# Patient Record
(demographics unavailable — no encounter records)

---

## 2025-06-19 NOTE — DISCUSSION/SUMMARY
[FreeTextEntry1] : Mr. ANITRA DIAZ is a very pleasant 70 year man with a past medical history of htn and patel  #HTN: well controlled - continue lisinopril  #Dyspnea - check echocardiogram FU 6 mo [EKG obtained to assist in diagnosis and management of assessed problem(s)] : EKG obtained to assist in diagnosis and management of assessed problem(s)

## 2025-06-19 NOTE — HISTORY OF PRESENT ILLNESS
[FreeTextEntry1] : Mr. ANITRA DIAZ is a very pleasant 70 year man with a past medical history of htn presenting for evaluation of lower leg numbness and tingling. Feels well otherwise and has no chest pains. 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  Had ABIs done by a vascular physician---this was normal.   Otherwise, denies orthopnea, paroxysmal nocturnal dyspnea, lower extremity edema, unexplained weight gain or dyspnea on exertion.

## 2025-07-01 NOTE — ASSESSMENT
[FreeTextEntry1] : ASSESSMENT: [The patient was accompanied today and was assisted with explaining their complaints today.] The patient comes in today with chronic exacerbated symptoms of shoulder discomfort in the form of tendinopathy bursitis impingement symptoms. We have discussed treatment options including activity modification HEP/therapy. Today patient elects for [injections].   The patient was adequately and thoroughly informed of my assessment of their current condition(s).  - This may diminish bodily function for the extremity. We discussed prognosis, tx modalities including operative and nonoperative options for the above diagnostic assessment. As always, 2nd opinion is always provided as an option. When accessible, I was able to review other physicians note(s) including reviewing other tests, imaging results as well as personally view these results for my own interpretation.   [bilateral] SUBACROMIAL SHOULDER INJECTION   Indication:  subacromial bursitis/impingement, pain   Risk, benefits and alternatives were discussed with the patient. Potential complications include bleeding and infection. Alcohol was used to prep the area.  Ethyl chloride spray was used as a topical anesthetic.  Using sterile technique, the injection needle was then directed from a standard posterior approach parallel to and inferior to the acromion. A 21g needle was used to inject 5 mL of 1% Lidocaine and 1 unit 10mg Kenalog.  No significant resistance was encountered.  A bandage was applied.  The patient tolerated the procedure well.    Patient instructed to avoid strenuous activity for 2 day(s). Specifically counseled regarding the signs and symptoms of potential infection and instructed to present promptly to clinic or hospital if such signs and symptoms arise. The patient was adequately and thoroughly informed of my assessment of their current condition(s).  DISCUSSION: 1.  Injections as above. Activity modification HEP.  2. We prn 3. [x]

## 2025-07-01 NOTE — HISTORY OF PRESENT ILLNESS
[FreeTextEntry1] : This is a 70 year yo patient presenting today with a history of significant shoulder discomfort difficulty with overhead motion.  Difficulty lifting. Affecting ADLs.

## 2025-07-01 NOTE — PHYSICAL EXAM
[de-identified] : Examination of the [bilateral] shoulder reveals equal active and passive motion as compared to the contralateral side There is a positive Speed, positive Marzena, positive Cabrera, tenderness with anterior shoulder compression. 3+/5 strength  [de-identified] : [4] views of [bilateral] shoulder were obtained today in my office and were seen by me and discussed with the patient.  These [show findings consistent with AC arthropathy and mild GH arthritis